# Patient Record
Sex: MALE | URBAN - METROPOLITAN AREA
[De-identification: names, ages, dates, MRNs, and addresses within clinical notes are randomized per-mention and may not be internally consistent; named-entity substitution may affect disease eponyms.]

---

## 2022-10-07 ENCOUNTER — OUT OF OFFICE VISIT (OUTPATIENT)
Dept: URBAN - METROPOLITAN AREA MEDICAL CENTER 12 | Facility: MEDICAL CENTER | Age: 72
End: 2022-10-07
Payer: COMMERCIAL

## 2022-10-07 DIAGNOSIS — R10.84 ABDOMINAL CRAMPING, GENERALIZED: ICD-10-CM

## 2022-10-07 DIAGNOSIS — R11.2 ACUTE NAUSEA WITH NONBILIOUS VOMITING: ICD-10-CM

## 2022-10-07 DIAGNOSIS — R93.3 ABN FINDINGS-GI TRACT: ICD-10-CM

## 2022-10-07 DIAGNOSIS — K29.60 ADENOPAPILLOMATOSIS GASTRICA: ICD-10-CM

## 2022-10-07 DIAGNOSIS — K26.9 CHILDHOOD DUODENAL ULCER: ICD-10-CM

## 2022-10-07 DIAGNOSIS — K25.9 ANTRAL ULCER: ICD-10-CM

## 2022-10-07 PROCEDURE — 99222 1ST HOSP IP/OBS MODERATE 55: CPT | Performed by: PHYSICIAN ASSISTANT

## 2022-10-07 PROCEDURE — G8427 DOCREV CUR MEDS BY ELIG CLIN: HCPCS | Performed by: PHYSICIAN ASSISTANT

## 2022-10-07 PROCEDURE — 43239 EGD BIOPSY SINGLE/MULTIPLE: CPT | Performed by: INTERNAL MEDICINE

## 2022-10-08 ENCOUNTER — OUT OF OFFICE VISIT (OUTPATIENT)
Dept: URBAN - METROPOLITAN AREA MEDICAL CENTER 12 | Facility: MEDICAL CENTER | Age: 72
End: 2022-10-08
Payer: COMMERCIAL

## 2022-10-08 DIAGNOSIS — R11.2 ACUTE NAUSEA WITH NONBILIOUS VOMITING: ICD-10-CM

## 2022-10-08 DIAGNOSIS — R10.84 ABDOMINAL CRAMPING, GENERALIZED: ICD-10-CM

## 2022-10-08 DIAGNOSIS — R93.3 ABN FINDINGS-GI TRACT: ICD-10-CM

## 2022-10-08 PROCEDURE — 99233 SBSQ HOSP IP/OBS HIGH 50: CPT

## 2022-10-10 ENCOUNTER — OUT OF OFFICE VISIT (OUTPATIENT)
Dept: URBAN - METROPOLITAN AREA MEDICAL CENTER 12 | Facility: MEDICAL CENTER | Age: 72
End: 2022-10-10
Payer: COMMERCIAL

## 2022-10-10 ENCOUNTER — OUT OF OFFICE VISIT (OUTPATIENT)
Dept: URBAN - METROPOLITAN AREA MEDICAL CENTER 12 | Facility: MEDICAL CENTER | Age: 72
End: 2022-10-10

## 2022-10-10 DIAGNOSIS — Z12.11 COLON CANCER SCREENING: ICD-10-CM

## 2022-10-10 DIAGNOSIS — R10.84 ABDOMINAL CRAMPING, GENERALIZED: ICD-10-CM

## 2022-10-10 DIAGNOSIS — K25.9 ANTRAL ULCER: ICD-10-CM

## 2022-10-10 DIAGNOSIS — R11.2 ACUTE NAUSEA WITH NONBILIOUS VOMITING: ICD-10-CM

## 2022-10-10 DIAGNOSIS — K26.9 CHILDHOOD DUODENAL ULCER: ICD-10-CM

## 2022-10-10 PROCEDURE — 992 NON-BILLABLE: Performed by: INTERNAL MEDICINE

## 2022-10-10 PROCEDURE — 99233 SBSQ HOSP IP/OBS HIGH 50: CPT | Performed by: STUDENT IN AN ORGANIZED HEALTH CARE EDUCATION/TRAINING PROGRAM

## 2022-11-17 ENCOUNTER — OFFICE VISIT (OUTPATIENT)
Dept: URBAN - METROPOLITAN AREA CLINIC 92 | Facility: CLINIC | Age: 72
End: 2022-11-17
Payer: COMMERCIAL

## 2022-11-17 ENCOUNTER — WEB ENCOUNTER (OUTPATIENT)
Dept: URBAN - METROPOLITAN AREA CLINIC 92 | Facility: CLINIC | Age: 72
End: 2022-11-17

## 2022-11-17 VITALS
BODY MASS INDEX: 31.69 KG/M2 | HEART RATE: 62 BPM | DIASTOLIC BLOOD PRESSURE: 85 MMHG | WEIGHT: 234 LBS | SYSTOLIC BLOOD PRESSURE: 140 MMHG | TEMPERATURE: 97.4 F | HEIGHT: 72 IN

## 2022-11-17 DIAGNOSIS — Z12.11 SCREENING FOR COLON CANCER: ICD-10-CM

## 2022-11-17 DIAGNOSIS — K25.9 GASTRIC ULCER WITHOUT HEMORRHAGE OR PERFORATION, UNSPECIFIED CHRONICITY: ICD-10-CM

## 2022-11-17 DIAGNOSIS — K26.9 DUODENAL ULCER: ICD-10-CM

## 2022-11-17 PROBLEM — 73481001: Status: ACTIVE | Noted: 2022-11-17

## 2022-11-17 PROCEDURE — 99204 OFFICE O/P NEW MOD 45 MIN: CPT

## 2022-11-17 RX ORDER — PANTOPRAZOLE SODIUM 40 MG/1
1 TABLET TABLET, DELAYED RELEASE ORAL TWICE A DAY
Qty: 60 TABLET | Refills: 0 | OUTPATIENT
Start: 2022-11-07

## 2022-11-17 NOTE — HPI-TODAY'S VISIT:
72 -year-old male presents today for hospital visit follow-up.  He was seen at [facility] on 10-1-2022 with complaints of abdominal pain.  He was complaining of abdominal pain that started 1 week prior and reported the pain as being middle upper abdomen described as bloating and burning, and he had 2 episodes of emesis prior to hospital arrival.  He did note he had history of a bleeding stomach ulcer in 2017 and was diagnosed via EGD in [place] he required 2 unit transfusion at the hospital.  Since that PUD was thought to be from naproxen and steroid use.  Since then, he has avoided NSAIDs. CT A/P revealed air-filled outpouchings with the second and third portions of duodenum with surrounding fat stranding and bowel wall thickening concerning for underlying duodenal ulcers.  No evidence of free air to suggest perforation.  Peripancreatic fat stranding favored to be reactive to above process however correlate with lipase for Pancreatitis.  EGD 10-7-2020 tail: 1 nonbleeding gastric ulcer with no stigmata of bleeding 10 mm, gastritis two nonbleeding duodenal ulcer 12 mm and 18 mm.  No malignancy on path negative H. pylori.   Was d/c with pantoprazole 40mg which he was been taking BID. Still having some pain but is better than before. Denies GERD sx, N/V, dysphagia and odynophagia. No NSAID uses.  Has 2 BM daily soft and formed or loose sometimes. Denies hematochezia, Had one black BM after discharge then was normal. No unexpected weight loss.  Last colonoscopy was a few years ago cannot remember. Never had polyps. Ed josefa h/o GI cancers

## 2022-12-08 PROBLEM — 51868009: Status: ACTIVE | Noted: 2022-11-07

## 2022-12-13 ENCOUNTER — TELEPHONE ENCOUNTER (OUTPATIENT)
Dept: URBAN - METROPOLITAN AREA CLINIC 92 | Facility: CLINIC | Age: 72
End: 2022-12-13

## 2022-12-13 RX ORDER — PANTOPRAZOLE SODIUM 40 MG/1
1 TABLET TABLET, DELAYED RELEASE ORAL TWICE A DAY
Qty: 60 TABLET | Refills: 1 | OUTPATIENT
Start: 2022-11-07

## 2023-01-10 ENCOUNTER — TELEPHONE ENCOUNTER (OUTPATIENT)
Dept: URBAN - METROPOLITAN AREA CLINIC 6 | Facility: CLINIC | Age: 73
End: 2023-01-10

## 2023-01-10 RX ORDER — PANTOPRAZOLE SODIUM 40 MG/1
1 TABLET TABLET, DELAYED RELEASE ORAL TWICE A DAY
Qty: 60 TABLET | Refills: 1
Start: 2022-11-07

## 2023-01-12 ENCOUNTER — OFFICE VISIT (OUTPATIENT)
Dept: URBAN - METROPOLITAN AREA SURGERY CENTER 16 | Facility: SURGERY CENTER | Age: 73
End: 2023-01-12

## 2023-01-26 ENCOUNTER — OFFICE VISIT (OUTPATIENT)
Dept: URBAN - METROPOLITAN AREA CLINIC 92 | Facility: CLINIC | Age: 73
End: 2023-01-26

## 2023-02-17 ENCOUNTER — TELEPHONE ENCOUNTER (OUTPATIENT)
Dept: URBAN - METROPOLITAN AREA CLINIC 92 | Facility: CLINIC | Age: 73
End: 2023-02-17

## 2023-02-21 ENCOUNTER — TELEPHONE ENCOUNTER (OUTPATIENT)
Dept: URBAN - METROPOLITAN AREA CLINIC 92 | Facility: CLINIC | Age: 73
End: 2023-02-21

## 2023-02-21 RX ORDER — PANTOPRAZOLE SODIUM 40 MG/1
1 TABLET TABLET, DELAYED RELEASE ORAL TWICE A DAY
Qty: 60 TABLET | Refills: 0
Start: 2022-11-07

## 2023-03-02 ENCOUNTER — CLAIMS CREATED FROM THE CLAIM WINDOW (OUTPATIENT)
Dept: URBAN - METROPOLITAN AREA SURGERY CENTER 16 | Facility: SURGERY CENTER | Age: 73
End: 2023-03-02
Payer: COMMERCIAL

## 2023-03-02 ENCOUNTER — TELEPHONE ENCOUNTER (OUTPATIENT)
Dept: URBAN - METROPOLITAN AREA CLINIC 92 | Facility: CLINIC | Age: 73
End: 2023-03-02

## 2023-03-02 ENCOUNTER — OFFICE VISIT (OUTPATIENT)
Dept: URBAN - METROPOLITAN AREA SURGERY CENTER 16 | Facility: SURGERY CENTER | Age: 73
End: 2023-03-02

## 2023-03-02 ENCOUNTER — CLAIMS CREATED FROM THE CLAIM WINDOW (OUTPATIENT)
Dept: URBAN - METROPOLITAN AREA CLINIC 4 | Facility: CLINIC | Age: 73
End: 2023-03-02
Payer: COMMERCIAL

## 2023-03-02 DIAGNOSIS — Z53.8 FAILED ATTEMPTED SURGICAL PROCEDURE: ICD-10-CM

## 2023-03-02 DIAGNOSIS — Z12.11 COLON CANCER SCREENING: ICD-10-CM

## 2023-03-02 DIAGNOSIS — K29.70 GASTRITIS, UNSPECIFIED, WITHOUT BLEEDING: ICD-10-CM

## 2023-03-02 DIAGNOSIS — K21.9 ACID REFLUX: ICD-10-CM

## 2023-03-02 DIAGNOSIS — K29.60 ADENOPAPILLOMATOSIS GASTRICA: ICD-10-CM

## 2023-03-02 PROCEDURE — 43239 EGD BIOPSY SINGLE/MULTIPLE: CPT | Performed by: INTERNAL MEDICINE

## 2023-03-02 PROCEDURE — 88305 TISSUE EXAM BY PATHOLOGIST: CPT | Performed by: PATHOLOGY

## 2023-03-02 PROCEDURE — 88312 SPECIAL STAINS GROUP 1: CPT | Performed by: PATHOLOGY

## 2023-03-02 PROCEDURE — G0121 COLON CA SCRN NOT HI RSK IND: HCPCS | Performed by: INTERNAL MEDICINE

## 2023-03-02 PROCEDURE — G8907 PT DOC NO EVENTS ON DISCHARG: HCPCS | Performed by: INTERNAL MEDICINE

## 2023-03-02 RX ORDER — PANTOPRAZOLE SODIUM 40 MG/1
1 TABLET TABLET, DELAYED RELEASE ORAL TWICE A DAY
Qty: 60 TABLET | Refills: 0 | Status: ACTIVE | COMMUNITY
Start: 2022-11-07

## 2023-03-02 RX ORDER — FLUCONAZOLE 100 MG/1
2 TABLETS X 1, THEN 1 TABLET TABLET ORAL
Qty: 15 | Refills: 0 | OUTPATIENT
Start: 2023-03-02 | End: 2023-03-16

## 2023-03-17 ENCOUNTER — OFFICE VISIT (OUTPATIENT)
Dept: URBAN - METROPOLITAN AREA CLINIC 92 | Facility: CLINIC | Age: 73
End: 2023-03-17

## 2023-04-06 ENCOUNTER — OFFICE VISIT (OUTPATIENT)
Dept: URBAN - METROPOLITAN AREA CLINIC 92 | Facility: CLINIC | Age: 73
End: 2023-04-06

## 2023-04-06 RX ORDER — PANTOPRAZOLE SODIUM 40 MG/1
1 TABLET TABLET, DELAYED RELEASE ORAL TWICE A DAY
Qty: 60 TABLET | Refills: 0 | COMMUNITY
Start: 2022-11-07

## 2023-04-06 NOTE — HPI-TODAY'S VISIT:
11/2022 72 -year-old male presents today for hospital visit follow-up.  He was seen at [facility] on 10-1-2022 with complaints of abdominal pain.  He was complaining of abdominal pain that started 1 week prior and reported the pain as being middle upper abdomen described as bloating and burning, and he had 2 episodes of emesis prior to hospital arrival.  He did note he had history of a bleeding stomach ulcer in 2017 and was diagnosed via EGD in [place] he required 2 unit transfusion at the hospital.  Since that PUD was thought to be from naproxen and steroid use.  Since then, he has avoided NSAIDs. CT A/P revealed air-filled outpouchings with the second and third portions of duodenum with surrounding fat stranding and bowel wall thickening concerning for underlying duodenal ulcers.  No evidence of free air to suggest perforation.  Peripancreatic fat stranding favored to be reactive to above process however correlate with lipase for Pancreatitis.  EGD 10-7-2020 tail: 1 nonbleeding gastric ulcer with no stigmata of bleeding 10 mm, gastritis two nonbleeding duodenal ulcer 12 mm and 18 mm.  No malignancy on path negative H. pylori.   Was d/c with pantoprazole 40mg which he was been taking BID. Still having some pain but is better than before. Denies GERD sx, N/V, dysphagia and odynophagia. No NSAID uses.  Has 2 BM daily soft and formed or loose sometimes. Denies hematochezia, Had one black BM after discharge then was normal. No unexpected weight loss.  Last colonoscopy was a few years ago cannot remember. Never had polyps. 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

## 2023-04-11 ENCOUNTER — TELEPHONE ENCOUNTER (OUTPATIENT)
Dept: URBAN - METROPOLITAN AREA CLINIC 92 | Facility: CLINIC | Age: 73
End: 2023-04-11

## 2023-05-11 ENCOUNTER — OFFICE VISIT (OUTPATIENT)
Dept: URBAN - METROPOLITAN AREA CLINIC 92 | Facility: CLINIC | Age: 73
End: 2023-05-11
Payer: COMMERCIAL

## 2023-05-11 ENCOUNTER — DASHBOARD ENCOUNTERS (OUTPATIENT)
Age: 73
End: 2023-05-11

## 2023-05-11 VITALS
WEIGHT: 220.4 LBS | SYSTOLIC BLOOD PRESSURE: 127 MMHG | TEMPERATURE: 97 F | BODY MASS INDEX: 29.85 KG/M2 | DIASTOLIC BLOOD PRESSURE: 70 MMHG | HEIGHT: 72 IN | HEART RATE: 77 BPM

## 2023-05-11 DIAGNOSIS — K59.04 CHRONIC IDIOPATHIC CONSTIPATION: ICD-10-CM

## 2023-05-11 DIAGNOSIS — Z12.11 SCREENING FOR COLON CANCER: ICD-10-CM

## 2023-05-11 DIAGNOSIS — K21.9 GASTROESOPHAGEAL REFLUX DISEASE WITHOUT ESOPHAGITIS: ICD-10-CM

## 2023-05-11 DIAGNOSIS — K26.9 DUODENAL ULCER: ICD-10-CM

## 2023-05-11 DIAGNOSIS — K25.9 GASTRIC ULCER WITHOUT HEMORRHAGE OR PERFORATION, UNSPECIFIED CHRONICITY: ICD-10-CM

## 2023-05-11 PROBLEM — 266435005: Status: ACTIVE | Noted: 2023-05-11

## 2023-05-11 PROBLEM — 82934008: Status: ACTIVE | Noted: 2023-05-11

## 2023-05-11 PROCEDURE — 99214 OFFICE O/P EST MOD 30 MIN: CPT

## 2023-05-11 RX ORDER — PANTOPRAZOLE SODIUM 40 MG/1
1 TABLET TABLET, DELAYED RELEASE ORAL ONCE A DAY
Qty: 90 TABLET | Refills: 3 | OUTPATIENT
Start: 2023-05-11

## 2023-05-11 RX ORDER — POLYETHYLENE GLYCOL 3350, SODIUM SULFATE ANHYDROUS, SODIUM BICARBONATE, SODIUM CHLORIDE, POTASSIUM CHLORIDE 236; 22.74; 6.74; 5.86; 2.97 G/4L; G/4L; G/4L; G/4L; G/4L
AS DIRECTED POWDER, FOR SOLUTION ORAL 1
Qty: 1 | Refills: 0 | OUTPATIENT
Start: 2023-05-11 | End: 2023-05-12

## 2023-05-11 RX ORDER — PANTOPRAZOLE SODIUM 40 MG/1
1 TABLET TABLET, DELAYED RELEASE ORAL TWICE A DAY
Qty: 60 TABLET | Refills: 0 | Status: ACTIVE | COMMUNITY
Start: 2022-11-07

## 2023-05-11 NOTE — HPI-TODAY'S VISIT:
11/2022 72 -year-old male presents today for hospital visit follow-up.  He was seen at [facility] on 10-1-2022 with complaints of abdominal pain.  He was complaining of abdominal pain that started 1 week prior and reported the pain as being middle upper abdomen described as bloating and burning, and he had 2 episodes of emesis prior to hospital arrival.  He did note he had history of a bleeding stomach ulcer in 2017 and was diagnosed via EGD in [place] he required 2 unit transfusion at the hospital.  Since that PUD was thought to be from naproxen and steroid use.  Since then, he has avoided NSAIDs. CT A/P revealed air-filled outpouchings with the second and third portions of duodenum with surrounding fat stranding and bowel wall thickening concerning for underlying duodenal ulcers.  No evidence of free air to suggest perforation.  Peripancreatic fat stranding favored to be reactive to above process however correlate with lipase for Pancreatitis.  EGD 10-7-2020 tail: 1 nonbleeding gastric ulcer with no stigmata of bleeding 10 mm, gastritis two nonbleeding duodenal ulcer 12 mm and 18 mm.  No malignancy on path negative H. pylori.   Was d/c with pantoprazole 40mg which he was been taking BID. Still having some pain but is better than before. Denies GERD sx, N/V, dysphagia and odynophagia. No NSAID uses.  Has 2 BM daily soft and formed or loose sometimes. Denies hematochezia, Had one black BM after discharge then was normal. No unexpected weight loss.  Last colonoscopy was a few years ago cannot remember. Never had polyps. Ed fam h/o GI cancers  5/2023 Since last visit a upper endoscopy was done on 3-2-2023 that demonstrated diffuse white plaques in the entire esophagus patient was then empirically placed on Diflucan 100 mg for 14 days Path came back demonstrating reflux type changes no evidence of Vee's, EOE, infection, malignancy patient also showed chronic gastritis nonspecific. He states that he ran out of pantoprazole one month ago and since has been having some increase GERD and stomach discomfort. Denies N/V, dysphagia and odynophagia. No NSAID uses.  Colonoscopy demonstrated clotted blood in the rectum and copious amounts of solid stool interfering with visualization patient was recommended to repeat this procedure. He states that he has 1bm daily and is sometimes constipated and has to strain. With this he will note blood when he wipes. He currently takes stool softners prn. No melena or unexpected weight loss.

## 2023-05-25 ENCOUNTER — OFFICE VISIT (OUTPATIENT)
Dept: URBAN - METROPOLITAN AREA SURGERY CENTER 16 | Facility: SURGERY CENTER | Age: 73
End: 2023-05-25

## 2023-06-22 ENCOUNTER — OFFICE VISIT (OUTPATIENT)
Dept: URBAN - METROPOLITAN AREA CLINIC 92 | Facility: CLINIC | Age: 73
End: 2023-06-22

## 2023-06-22 RX ORDER — PANTOPRAZOLE SODIUM 40 MG/1
1 TABLET TABLET, DELAYED RELEASE ORAL ONCE A DAY
Qty: 90 TABLET | Refills: 3 | OUTPATIENT

## 2023-06-22 NOTE — HPI-TODAY'S VISIT:
11/2022 72 -year-old male presents today for hospital visit follow-up.  He was seen at [facility] on 10-1-2022 with complaints of abdominal pain.  He was complaining of abdominal pain that started 1 week prior and reported the pain as being middle upper abdomen described as bloating and burning, and he had 2 episodes of emesis prior to hospital arrival.  He did note he had history of a bleeding stomach ulcer in 2017 and was diagnosed via EGD in [place] he required 2 unit transfusion at the hospital.  Since that PUD was thought to be from naproxen and steroid use.  Since then, he has avoided NSAIDs. CT A/P revealed air-filled outpouchings with the second and third portions of duodenum with surrounding fat stranding and bowel wall thickening concerning for underlying duodenal ulcers.  No evidence of free air to suggest perforation.  Peripancreatic fat stranding favored to be reactive to above process however correlate with lipase for Pancreatitis.  EGD 10-7-2020 tail: 1 nonbleeding gastric ulcer with no stigmata of bleeding 10 mm, gastritis two nonbleeding duodenal ulcer 12 mm and 18 mm.  No malignancy on path negative H. pylori.   Was d/c with pantoprazole 40mg which he was been taking BID. Still having some pain but is better than before. Denies GERD sx, N/V, dysphagia and odynophagia. No NSAID uses.  Has 2 BM daily soft and formed or loose sometimes. Denies hematochezia, Had one black BM after discharge then was normal. No unexpected weight loss.  Last colonoscopy was a few years ago cannot remember. Never had polyps. Ed fam h/o GI cancers  5/2023 Since last visit a upper endoscopy was done on 3-2-2023 that demonstrated diffuse white plaques in the entire esophagus patient was then empirically placed on Diflucan 100 mg for 14 days Path came back demonstrating reflux type changes no evidence of Vee's, EOE, infection, malignancy patient also showed chronic gastritis nonspecific. He states that he ran out of pantoprazole one month ago and since has been having some increase GERD and stomach discomfort. Denies N/V, dysphagia and odynophagia. No NSAID uses.  Colonoscopy demonstrated clotted blood in the rectum and copious amounts of solid stool interfering with visualization patient was recommended to repeat this procedure. He states that he has 1bm daily and is sometimes constipated and has to strain. With this he will note blood when he wipes. He currently takes stool softners prn. No melena or unexpected weight loss.

## 2023-08-02 ENCOUNTER — OFFICE VISIT (OUTPATIENT)
Dept: URBAN - METROPOLITAN AREA CLINIC 92 | Facility: CLINIC | Age: 73
End: 2023-08-02

## 2023-08-02 RX ORDER — PANTOPRAZOLE SODIUM 40 MG/1
1 TABLET TABLET, DELAYED RELEASE ORAL TWICE A DAY
Qty: 60 TABLET | Refills: 0 | Status: ACTIVE | COMMUNITY
Start: 2022-11-07

## 2023-08-02 RX ORDER — PANTOPRAZOLE SODIUM 40 MG/1
1 TABLET TABLET, DELAYED RELEASE ORAL ONCE A DAY
Qty: 90 TABLET | Refills: 3 | Status: ACTIVE | COMMUNITY

## 2023-08-02 RX ORDER — PANTOPRAZOLE SODIUM 40 MG/1
1 TABLET TABLET, DELAYED RELEASE ORAL ONCE A DAY
Qty: 90 TABLET | Refills: 3 | OUTPATIENT

## 2023-08-02 NOTE — HPI-TODAY'S VISIT:
11/2022 72 -year-old male presents today for hospital visit follow-up.  He was seen at [facility] on 10-1-2022 with complaints of abdominal pain.  He was complaining of abdominal pain that started 1 week prior and reported the pain as being middle upper abdomen described as bloating and burning, and he had 2 episodes of emesis prior to hospital arrival.  He did note he had history of a bleeding stomach ulcer in 2017 and was diagnosed via EGD in [place] he required 2 unit transfusion at the hospital.  Since that PUD was thought to be from naproxen and steroid use.  Since then, he has avoided NSAIDs. CT A/P revealed air-filled outpouchings with the second and third portions of duodenum with surrounding fat stranding and bowel wall thickening concerning for underlying duodenal ulcers.  No evidence of free air to suggest perforation.  Peripancreatic fat stranding favored to be reactive to above process however correlate with lipase for Pancreatitis.  EGD 10-7-2020 tail: 1 nonbleeding gastric ulcer with no stigmata of bleeding 10 mm, gastritis two nonbleeding duodenal ulcer 12 mm and 18 mm.  No malignancy on path negative H. pylori.   Was d/c with pantoprazole 40mg which he was been taking BID. Still having some pain but is better than before. Denies GERD sx, N/V, dysphagia and odynophagia. No NSAID uses.  Has 2 BM daily soft and formed or loose sometimes. Denies hematochezia, Had one black BM after discharge then was normal. No unexpected weight loss.  Last colonoscopy was a few years ago cannot remember. Never had polyps. Ed fam h/o GI cancers  5/2023 Since last visit a upper endoscopy was done on 3-2-2023 that demonstrated diffuse white plaques in the entire esophagus patient was then empirically placed on Diflucan 100 mg for 14 days Path came back demonstrating reflux type changes no evidence of Vee's, EOE, infection, malignancy patient also showed chronic gastritis nonspecific. He states that he ran out of pantoprazole one month ago and since has been having some increase GERD and stomach discomfort. Denies N/V, dysphagia and odynophagia. No NSAID uses.  Colonoscopy demonstrated clotted blood in the rectum and copious amounts of solid stool interfering with visualization patient was recommended to repeat this procedure. He states that he has 1bm daily and is sometimes constipated and has to strain. With this he will note blood when he wipes. He currently takes stool softners prn. No melena or unexpected weight loss. Patient did not have colonoscopy yet